# Patient Record
Sex: MALE | Race: WHITE | NOT HISPANIC OR LATINO | URBAN - NONMETROPOLITAN AREA
[De-identification: names, ages, dates, MRNs, and addresses within clinical notes are randomized per-mention and may not be internally consistent; named-entity substitution may affect disease eponyms.]

---

## 2020-07-23 ENCOUNTER — OFFICE VISIT (OUTPATIENT)
Dept: CARDIOLOGY | Facility: CLINIC | Age: 75
End: 2020-07-23

## 2020-07-23 VITALS
SYSTOLIC BLOOD PRESSURE: 130 MMHG | DIASTOLIC BLOOD PRESSURE: 80 MMHG | HEART RATE: 54 BPM | HEIGHT: 69 IN | OXYGEN SATURATION: 97 % | WEIGHT: 167 LBS | BODY MASS INDEX: 24.73 KG/M2

## 2020-07-23 DIAGNOSIS — Z87.898 HISTORY OF SYNCOPE: ICD-10-CM

## 2020-07-23 DIAGNOSIS — I10 ESSENTIAL HYPERTENSION: ICD-10-CM

## 2020-07-23 DIAGNOSIS — E78.5 DYSLIPIDEMIA: ICD-10-CM

## 2020-07-23 DIAGNOSIS — I25.810 CORONARY ARTERY DISEASE INVOLVING CORONARY BYPASS GRAFT OF NATIVE HEART WITHOUT ANGINA PECTORIS: Primary | ICD-10-CM

## 2020-07-23 PROBLEM — N40.0 BPH (BENIGN PROSTATIC HYPERPLASIA): Status: ACTIVE | Noted: 2020-07-23

## 2020-07-23 PROCEDURE — 93000 ELECTROCARDIOGRAM COMPLETE: CPT | Performed by: INTERNAL MEDICINE

## 2020-07-23 PROCEDURE — 99204 OFFICE O/P NEW MOD 45 MIN: CPT | Performed by: INTERNAL MEDICINE

## 2020-07-23 RX ORDER — ATORVASTATIN CALCIUM 20 MG/1
20 TABLET, FILM COATED ORAL NIGHTLY
Qty: 90 TABLET | Refills: 3 | Status: SHIPPED | OUTPATIENT
Start: 2020-07-23 | End: 2020-09-08 | Stop reason: SDUPTHER

## 2020-07-23 RX ORDER — FINASTERIDE 5 MG/1
TABLET, FILM COATED ORAL
COMMUNITY

## 2020-07-23 RX ORDER — TAMSULOSIN HYDROCHLORIDE 0.4 MG/1
CAPSULE ORAL
COMMUNITY
End: 2022-03-03

## 2020-07-23 RX ORDER — ATORVASTATIN CALCIUM 10 MG/1
TABLET, FILM COATED ORAL
COMMUNITY
End: 2020-07-23 | Stop reason: SDUPTHER

## 2020-07-23 NOTE — PROGRESS NOTES
Reason for Visit: History of cardiac disease.    HPI:  Graeme Fernandez is a 74 y.o. male is being seen for consultation today at the request of Mike Toro MD.  He was previously seen by Dr. Doan in 2016 for evaluation of syncope.  He had a tilt table test done in June 2016 that was positive for vasovagal syncope.  He had coronary artery bypass grafting done at [facility] in 2017.  He had a loop recorder placed at [facility] after the surgery but he had it removed by Dr Us since it had not shown any evidence of arrhythmia. He reports the monitor was placed due to his history of syncope.  He has not had any further syncopal episodes since 2016.  He walks a minimum of 2 miles every day.  He denies any chest pain, palpitations, dizziness, syncope, PND, or orthopnea.       Previous Cardiac Testing and Procedures:  -Holter monitor (5/1/2016) rare atrial ventricular ectopic beats, no significant dysrhythmias, no symptoms reported  -Tilt table test (6/15/2016) positive for vasovagal syncope  -Lipid panel (6/23/2020) total cholesterol 165, HDL 52, LDL 87, triglycerides 119    Patient Active Problem List   Diagnosis   • BPH (benign prostatic hyperplasia)   • Dyslipidemia   • Essential hypertension   • Coronary artery disease involving coronary bypass graft of native heart without angina pectoris       Social History     Tobacco Use   • Smoking status: Never Smoker   • Smokeless tobacco: Never Used   Substance Use Topics   • Alcohol use: No   • Drug use: No       Family History   Problem Relation Age of Onset   • Heart disease Mother    • Alzheimer's disease Mother    • Emphysema Father        The following portions of the patient's history were reviewed and updated as appropriate: allergies, current medications, past family history, past medical history, past social history, past surgical history and problem list.      Current Outpatient Medications:   •  aspirin 81 MG EC tablet, Take 81 mg by mouth Daily., Disp: ,  "Rfl:   •  atorvastatin (LIPITOR) 20 MG tablet, Take 1 tablet by mouth Every Night., Disp: 90 tablet, Rfl: 3  •  finasteride (PROSCAR) 5 MG tablet, finasteride 5 mg tablet, Disp: , Rfl:   •  metoprolol tartrate (LOPRESSOR) 25 MG tablet, Take 25 mg by mouth 2 (Two) Times a Day. TAKING 1/2 TABLET BID, Disp: , Rfl:   •  Multiple Vitamins-Minerals (MULTIVITAMIN ADULT PO), Take  by mouth daily., Disp: , Rfl:   •  tamsulosin (FLOMAX) 0.4 MG capsule 24 hr capsule, tamsulosin 0.4 mg capsule, Disp: , Rfl:     Review of Systems   Constitution: Negative for chills, fever and weight loss.   HENT: Negative for sore throat.    Eyes: Negative for blurred vision and visual disturbance.   Cardiovascular: Negative for chest pain, dyspnea on exertion, leg swelling, palpitations, paroxysmal nocturnal dyspnea and syncope.   Respiratory: Negative for cough and shortness of breath.    Endocrine: Negative for cold intolerance and polyuria.   Skin: Negative for itching and rash.   Musculoskeletal: Negative for joint swelling and myalgias.   Gastrointestinal: Negative for abdominal pain, diarrhea, heartburn and vomiting.   Genitourinary: Negative for dysuria and hematuria.   Neurological: Negative for dizziness, headaches and numbness.   Psychiatric/Behavioral: Negative for depression. The patient is not nervous/anxious.    Allergic/Immunologic: Negative for hives.       Objective   /80 (BP Location: Left arm, Patient Position: Sitting, Cuff Size: Adult)   Pulse 54   Ht 175.3 cm (69\")   Wt 75.8 kg (167 lb)   SpO2 97%   BMI 24.66 kg/m²   Physical Exam   Constitutional: He is oriented to person, place, and time. He appears well-developed and well-nourished.   HENT:   Head: Normocephalic and atraumatic.   Eyes: Pupils are equal, round, and reactive to light. Conjunctivae and EOM are normal.   Neck: Normal range of motion. Neck supple. No JVD present. No thyromegaly present.   Cardiovascular: Normal rate, regular rhythm and normal " heart sounds.   No murmur heard.  Pulmonary/Chest: Effort normal and breath sounds normal. He has no wheezes. He has no rales.   Abdominal: Soft. Bowel sounds are normal. He exhibits no distension. There is no tenderness.   Musculoskeletal: Normal range of motion. He exhibits no edema.   Neurological: He is alert and oriented to person, place, and time. Coordination normal.   Skin: Skin is warm and dry. No rash noted.   Psychiatric: He has a normal mood and affect. His behavior is normal.       ECG 12 Lead  Date/Time: 7/23/2020 9:22 AM  Performed by: Shade Tucker MD  Authorized by: Shade Tucker MD   Comparison: compared with previous ECG from 5/30/2018  Similar to previous ECG  Rhythm: sinus bradycardia  Other findings: non-specific ST-T wave changes              ICD-10-CM ICD-9-CM   1. Coronary artery disease involving coronary bypass graft of native heart without angina pectoris I25.810 414.05   2. Dyslipidemia E78.5 272.4   3. Essential hypertension I10 401.9   4. History of syncope Z87.898 V15.89         Assessment/Plan:  1.  Coronary artery disease: History of CABG in 2017 at [place].  Will obtain copy of heart cath and CABG report.  He is asymptomatic.  Continue aspirin, atorvastatin, and metoprolol.    2.  Dyslipidemia: LDL is slightly above goal at 87 (recommended less than 70 given his history of coronary artery disease) will titrate up atorvastatin to 20 mg and  on lifestyle modification.    3.  Essential hypertension: Blood pressure is reasonably well controlled on metoprolol.    4.  History of syncope: Previously diagnosed with vasovagal syncope.  He had a loop recorder placed at [place] which he subsequently had removed due to lack of perceived benefit.  Sinus bradycardia on EKG today.  Continue to monitor.

## 2020-09-07 RX ORDER — ATORVASTATIN CALCIUM 20 MG/1
20 TABLET, FILM COATED ORAL NIGHTLY
Qty: 90 TABLET | Refills: 3 | Status: CANCELLED | OUTPATIENT
Start: 2020-09-07

## 2020-09-08 RX ORDER — ATORVASTATIN CALCIUM 20 MG/1
20 TABLET, FILM COATED ORAL NIGHTLY
Qty: 90 TABLET | Refills: 3 | Status: SHIPPED | OUTPATIENT
Start: 2020-09-08 | End: 2021-08-12 | Stop reason: SDUPTHER

## 2021-08-12 ENCOUNTER — OFFICE VISIT (OUTPATIENT)
Dept: CARDIOLOGY | Facility: CLINIC | Age: 76
End: 2021-08-12

## 2021-08-12 VITALS
SYSTOLIC BLOOD PRESSURE: 112 MMHG | HEIGHT: 69 IN | WEIGHT: 161 LBS | DIASTOLIC BLOOD PRESSURE: 78 MMHG | OXYGEN SATURATION: 98 % | BODY MASS INDEX: 23.85 KG/M2 | HEART RATE: 60 BPM

## 2021-08-12 DIAGNOSIS — I10 ESSENTIAL HYPERTENSION: ICD-10-CM

## 2021-08-12 DIAGNOSIS — E78.5 DYSLIPIDEMIA: ICD-10-CM

## 2021-08-12 DIAGNOSIS — Z87.898 HISTORY OF SYNCOPE: ICD-10-CM

## 2021-08-12 DIAGNOSIS — I25.810 CORONARY ARTERY DISEASE INVOLVING CORONARY BYPASS GRAFT OF NATIVE HEART WITHOUT ANGINA PECTORIS: Primary | ICD-10-CM

## 2021-08-12 PROCEDURE — 99214 OFFICE O/P EST MOD 30 MIN: CPT | Performed by: INTERNAL MEDICINE

## 2021-08-12 RX ORDER — ATORVASTATIN CALCIUM 20 MG/1
20 TABLET, FILM COATED ORAL NIGHTLY
Qty: 90 TABLET | Refills: 3 | Status: SHIPPED | OUTPATIENT
Start: 2021-08-12 | End: 2022-08-09

## 2021-08-12 NOTE — PROGRESS NOTES
Reason for Visit: cardiovascular follow up.    HPI:  Graeme Fernandez is a 75 y.o. male is here today for 1 year follow-up.  Last seen in consultation in July 2020 due to a history of cardiac disease.  Last year he reported being active and feeling well.  He continues to do well and is not having any issues or complaints.  He denies any chest pain, palpitations, dizziness, syncope, PND, or orthopnea.  His blood pressure has been well controlled.  He is active and either walks or does the treadmill regularly.  He tries to eat healthy and has lost a few pounds recently with cutting back on bread.      Previous Cardiac Testing and Procedures:  -Holter monitor (5/1/2016) rare atrial ventricular ectopic beats, no significant dysrhythmias, no symptoms reported  -Tilt table test (6/15/2016) positive for vasovagal syncope  -LHC (4/20/2017) severe single-vessel CAD involving proximal LAD and a large diagonal, normal LCx and RCA  -2 vessel CABG (5/4/2017 at [place]) LIMA to LAD, SVG to diagonal  -Lipid panel (6/23/2020) total cholesterol 165, HDL 52, LDL 87, triglycerides 119    Patient Active Problem List   Diagnosis   • BPH (benign prostatic hyperplasia)   • Dyslipidemia   • Essential hypertension   • Coronary artery disease involving coronary bypass graft of native heart without angina pectoris       Social History     Tobacco Use   • Smoking status: Never Smoker   • Smokeless tobacco: Never Used   Substance Use Topics   • Alcohol use: No   • Drug use: No       Family History   Problem Relation Age of Onset   • Heart disease Mother    • Alzheimer's disease Mother    • Emphysema Father        The following portions of the patient's history were reviewed and updated as appropriate: allergies, current medications, past family history, past medical history, past social history, past surgical history and problem list.      Current Outpatient Medications:   •  aspirin 81 MG EC tablet, Take 81 mg by mouth Daily., Disp: , Rfl:  "  •  atorvastatin (LIPITOR) 20 MG tablet, Take 1 tablet by mouth Every Night., Disp: 90 tablet, Rfl: 3  •  finasteride (PROSCAR) 5 MG tablet, finasteride 5 mg tablet, Disp: , Rfl:   •  metoprolol tartrate (LOPRESSOR) 25 MG tablet, Take 25 mg by mouth 2 (Two) Times a Day. TAKING 1/2 TABLET BID, Disp: , Rfl:   •  Multiple Vitamins-Minerals (MULTIVITAMIN ADULT PO), Take  by mouth daily., Disp: , Rfl:   •  tamsulosin (FLOMAX) 0.4 MG capsule 24 hr capsule, tamsulosin 0.4 mg capsule, Disp: , Rfl:     Review of Systems   Constitutional: Negative for chills and fever.   Cardiovascular: Negative for chest pain, irregular heartbeat, palpitations, paroxysmal nocturnal dyspnea and syncope.   Respiratory: Negative for cough and shortness of breath.    Skin: Negative for rash.   Gastrointestinal: Negative for abdominal pain and heartburn.   Neurological: Negative for dizziness and numbness.       Objective   /78 (BP Location: Left arm, Patient Position: Sitting, Cuff Size: Adult)   Pulse 60   Ht 175.3 cm (69\")   Wt 73 kg (161 lb)   SpO2 98%   BMI 23.78 kg/m²   Constitutional:       Appearance: Well-developed.   HENT:      Head: Normocephalic and atraumatic.   Pulmonary:      Effort: Pulmonary effort is normal.      Breath sounds: Normal breath sounds.   Cardiovascular:      Normal rate. Regular rhythm.      Murmurs: There is no murmur.      No gallop.   Edema:     Peripheral edema absent.   Skin:     General: Skin is warm and dry.   Neurological:      Mental Status: Alert and oriented to person, place, and time.       Procedures      ICD-10-CM ICD-9-CM   1. Coronary artery disease involving coronary bypass graft of native heart without angina pectoris  I25.810 414.05   2. Dyslipidemia  E78.5 272.4   3. Essential hypertension  I10 401.9   4. History of syncope  Z87.898 V15.89         Assessment/Plan:  1. Coronary artery disease: History of two-vessel CABG in 2017 with LIMA to LAD and SVG to diagonal.  He remains chest " pain-free.  Continue aspirin, atorvastatin, and metoprolol.     2.  Dyslipidemia: LDL was slightly above goal at 87 on last available lipid panel from 6/2020.  Obtain copy of most recent lipid panel from PCP.       3.  Essential hypertension: Blood pressure remains well controlled.  Continue metoprolol.      4.  History of syncope: History of vasovagal syncope.  No evidence of recurrence.

## 2022-02-28 ENCOUNTER — TELEPHONE (OUTPATIENT)
Dept: CARDIOLOGY | Facility: CLINIC | Age: 77
End: 2022-02-28

## 2022-02-28 ENCOUNTER — OUTSIDE FACILITY SERVICE (OUTPATIENT)
Dept: CARDIOLOGY | Facility: CLINIC | Age: 77
End: 2022-02-28

## 2022-02-28 NOTE — TELEPHONE ENCOUNTER
It is reasonable to make an appointment for this patient to be seen in clinic for further evaluation.

## 2022-02-28 NOTE — TELEPHONE ENCOUNTER
Pt wife, Jannette, called this morning.  She stated that yesterday while home, pt passed out for several minutes.  Pt wife said that she patted his face, put a cold wash cloth on him and was calling his name.  She ended up calling 911 to which the paramedics arrived shortly thereafter.  By this time, the pt was awake and talking but had no memory of what had happened.  The emergency staff evaluated the pt and then left.  The pt has since felt very weak.  Jannette stated that she has pushed fluids on the pt for hydration.  She is concerned about this being an incident that is heart related.  She wasn't sure if he may need some type of testing or EKG.  She also doesn't mind driving to [place] if needed.    Please advise.

## 2022-02-28 NOTE — TELEPHONE ENCOUNTER
Called pt wife back to let her know that Dr. Tucker said that a check up in office would be reasonable.  They are currently in the ER at [facility] and will call us afterward.

## 2022-02-28 NOTE — TELEPHONE ENCOUNTER
I returned pt wife call. I advised her to take her  to ER instead. I explained to her that the hospital can do more test than what we could offer for the pt in the office settings. She voiced understanding and the pt agreed.

## 2022-03-02 PROCEDURE — 93227 XTRNL ECG REC<48 HR R&I: CPT | Performed by: INTERNAL MEDICINE

## 2022-03-03 ENCOUNTER — OFFICE VISIT (OUTPATIENT)
Dept: CARDIOLOGY | Facility: CLINIC | Age: 77
End: 2022-03-03

## 2022-03-03 VITALS
WEIGHT: 160 LBS | HEIGHT: 69 IN | BODY MASS INDEX: 23.7 KG/M2 | SYSTOLIC BLOOD PRESSURE: 124 MMHG | HEART RATE: 58 BPM | OXYGEN SATURATION: 99 % | DIASTOLIC BLOOD PRESSURE: 76 MMHG

## 2022-03-03 DIAGNOSIS — R55 SYNCOPE AND COLLAPSE: ICD-10-CM

## 2022-03-03 DIAGNOSIS — I10 ESSENTIAL HYPERTENSION: ICD-10-CM

## 2022-03-03 DIAGNOSIS — E78.5 DYSLIPIDEMIA: ICD-10-CM

## 2022-03-03 DIAGNOSIS — I25.810 CORONARY ARTERY DISEASE INVOLVING CORONARY BYPASS GRAFT OF NATIVE HEART WITHOUT ANGINA PECTORIS: Primary | ICD-10-CM

## 2022-03-03 PROCEDURE — 99214 OFFICE O/P EST MOD 30 MIN: CPT | Performed by: INTERNAL MEDICINE

## 2022-03-03 RX ORDER — TAMSULOSIN HYDROCHLORIDE 0.4 MG/1
1 CAPSULE ORAL DAILY
COMMUNITY
End: 2022-09-07

## 2022-03-03 RX ORDER — PROPYLENE GLYCOL 0.06 MG/ML
SOLUTION/ DROPS OPHTHALMIC
COMMUNITY

## 2022-03-03 NOTE — PROGRESS NOTES
Reason for Visit: cardiovascular follow up.    HPI:  Graeme Fernandez is a 76 y.o. male is here today for follow-up after a syncopal episode. This occurred on 2/28/2021. He went to [facility]. Work-up in the emergency room was unremarkable. He had a Holter monitor placed on discharge, which did not show any significant arrhythmias.  He was on a Sunday school zoom meeting when it happened.  He felt weak and dizzy afterwards.  The night before he was at a basketball game and didn't drink much that night to avoid having to go the bathroom.  He has been trying to drink more water following the episode.  He has been walking regularly.      Previous Cardiac Testing and Procedures:  -Holter monitor (5/1/2016) rare atrial ventricular ectopic beats, no significant dysrhythmias, no symptoms reported  -Tilt table test (6/15/2016) positive for vasovagal syncope  -LHC (4/20/2017) severe single-vessel CAD involving proximal LAD and a large diagonal, normal LCx and RCA  -2 vessel CABG (5/4/2017 at [place]) LIMA to LAD, SVG to diagonal  -Lipid panel (6/23/2020) total cholesterol 165, HDL 52, LDL 87, triglycerides 119  -Lipid panel (5/23/2021) total cholesterol 139, HDL 42, LDL 57, triglycerides 196  -BMP (3/28/2022) creatinine 1.03, potassium 3.9, sodium 139  -NT proBNP (2/28/2022) forty-four, normal 0-125  -Chest x-ray (2/28/2022) mild left lower lobe opacities, evidence of CABG  -Holter monitor (2/28/2022) rare atrial and ventricular ectopic beats, no significant pauses, arrhythmias, or events, no symptoms reported    Patient Active Problem List   Diagnosis   • BPH (benign prostatic hyperplasia)   • Dyslipidemia   • Essential hypertension   • Coronary artery disease involving coronary bypass graft of native heart without angina pectoris       Social History     Tobacco Use   • Smoking status: Never Smoker   • Smokeless tobacco: Never Used   Vaping Use   • Vaping Use: Never used   Substance Use Topics   •  "Alcohol use: No   • Drug use: No       Family History   Problem Relation Age of Onset   • Heart disease Mother    • Alzheimer's disease Mother    • Emphysema Father        The following portions of the patient's history were reviewed and updated as appropriate: allergies, current medications, past family history, past medical history, past social history, past surgical history and problem list.      Current Outpatient Medications:   •  aspirin 81 MG EC tablet, Take 81 mg by mouth Daily., Disp: , Rfl:   •  atorvastatin (LIPITOR) 20 MG tablet, Take 1 tablet by mouth Every Night., Disp: 90 tablet, Rfl: 3  •  finasteride (PROSCAR) 5 MG tablet, finasteride 5 mg tablet, Disp: , Rfl:   •  metoprolol tartrate (LOPRESSOR) 25 MG tablet, Take 25 mg by mouth 2 (Two) Times a Day. TAKING 1/2 TABLET BID, Disp: , Rfl:   •  Multiple Vitamins-Minerals (MULTIVITAMIN ADULT PO), Take  by mouth daily., Disp: , Rfl:   •  tamsulosin (FLOMAX) 0.4 MG capsule 24 hr capsule, Take 1 capsule by mouth Daily., Disp: , Rfl:   •  Propylene Glycol (Systane Complete) 0.6 % solution, Systane Complete 0.6 % eye drops  Systane Complete 0.6 % eye drops, Disp: , Rfl:     Review of Systems   Constitutional: Negative for chills and fever.   Cardiovascular: Positive for syncope. Negative for chest pain and paroxysmal nocturnal dyspnea.   Respiratory: Negative for cough and shortness of breath.    Skin: Negative for rash.   Gastrointestinal: Negative for abdominal pain and heartburn.   Genitourinary: Positive for frequency.   Neurological: Negative for dizziness and numbness.       Objective   /76 (BP Location: Left arm, Patient Position: Sitting, Cuff Size: Adult)   Pulse 58   Ht 175.3 cm (69.02\")   Wt 72.6 kg (160 lb)   SpO2 99%   BMI 23.62 kg/m²   Constitutional:       Appearance: Well-developed and normal weight.   HENT:      Head: Normocephalic and atraumatic.   Neck:      Vascular: Normal carotid pulses. No carotid bruit.   Pulmonary:      " Effort: Pulmonary effort is normal.      Breath sounds: Normal breath sounds.   Cardiovascular:      Normal rate. Regular rhythm.      Murmurs: There is no murmur.      No gallop. No click.   Edema:     Peripheral edema absent.   Skin:     General: Skin is warm and dry.   Neurological:      Mental Status: Alert and oriented to person, place, and time.   Psychiatric:         Attention and Perception: Attention normal.         Mood and Affect: Mood normal.       Procedures      ICD-10-CM ICD-9-CM   1. Coronary artery disease involving coronary bypass graft of native heart without angina pectoris  I25.810 414.05   2. Essential hypertension  I10 401.9   3. Dyslipidemia  E78.5 272.4   4. Syncope and collapse  R55 780.2         Assessment/Plan:  1.  Coronary artery disease: History of two-vessel CABG in 2017 with LIMA to LAD and SVG to diagonal.  No chest pain symptoms.  Continue aspirin, atorvastatin, and metoprolol.     2.  Dyslipidemia: LDL was slightly above goal at 87 on last available lipid panel from 6/2020.  Obtain copy of most recent lipid panel from PCP.       3.  Essential hypertension: Blood pressure is within normal limits today on metoprolol.     4.  Syncope: Most recent episode appears to be due to volume depletion.  Flomax could have also contributed.  Physical exam is normal no evidence of any cardiac etiology.  Holter monitor from 2/28/2022 showed no evidence of any significant arrhythmias.  Counseled on staying well-hydrated.  May need to consider alternatives Flomax if he has further episodes.  If he continues to have episodes echocardiogram and carotid ultrasound could be considered.

## 2022-05-10 ENCOUNTER — OUTSIDE FACILITY SERVICE (OUTPATIENT)
Dept: CARDIOLOGY | Facility: CLINIC | Age: 77
End: 2022-05-10

## 2022-05-10 PROCEDURE — 93018 CV STRESS TEST I&R ONLY: CPT | Performed by: INTERNAL MEDICINE

## 2022-05-10 PROCEDURE — 93350 STRESS TTE ONLY: CPT | Performed by: INTERNAL MEDICINE

## 2022-08-09 RX ORDER — ATORVASTATIN CALCIUM 20 MG/1
TABLET, FILM COATED ORAL
Qty: 90 TABLET | Refills: 3 | Status: SHIPPED | OUTPATIENT
Start: 2022-08-09

## 2022-09-07 ENCOUNTER — OFFICE VISIT (OUTPATIENT)
Dept: CARDIOLOGY | Facility: CLINIC | Age: 77
End: 2022-09-07

## 2022-09-07 VITALS
WEIGHT: 158 LBS | OXYGEN SATURATION: 98 % | BODY MASS INDEX: 23.4 KG/M2 | SYSTOLIC BLOOD PRESSURE: 120 MMHG | HEIGHT: 69 IN | DIASTOLIC BLOOD PRESSURE: 76 MMHG | HEART RATE: 73 BPM

## 2022-09-07 DIAGNOSIS — E78.5 DYSLIPIDEMIA: ICD-10-CM

## 2022-09-07 DIAGNOSIS — I10 ESSENTIAL HYPERTENSION: ICD-10-CM

## 2022-09-07 DIAGNOSIS — Z87.898 HISTORY OF SYNCOPE: ICD-10-CM

## 2022-09-07 DIAGNOSIS — I25.810 CORONARY ARTERY DISEASE INVOLVING CORONARY BYPASS GRAFT OF NATIVE HEART WITHOUT ANGINA PECTORIS: Primary | ICD-10-CM

## 2022-09-07 PROCEDURE — 99214 OFFICE O/P EST MOD 30 MIN: CPT | Performed by: INTERNAL MEDICINE

## 2022-09-07 RX ORDER — TAMSULOSIN HYDROCHLORIDE 0.4 MG/1
1 CAPSULE ORAL DAILY
COMMUNITY

## 2022-09-07 NOTE — PROGRESS NOTES
Reason for Visit: cardiovascular follow up.    HPI:  Graeme Fernandez is a 76 y.o. male is here today for 6-month follow-up.  He was last seen in March after a syncopal episode.  He has not had any further episodes since last visit.  He has been working to drink more water and he really thinks that dehydration played a role in his previous syncopal episodes.  He denies any chest pain, palpitations, dizziness, PND, or orthopnea.    Previous Cardiac Testing and Procedures:  -Holter monitor (5/1/2016) rare atrial ventricular ectopic beats, no significant dysrhythmias, no symptoms reported  -Tilt table test (6/15/2016) positive for vasovagal syncope  -LHC (4/20/2017) severe single-vessel CAD involving proximal LAD and a large diagonal, normal LCx and RCA  -2 vessel CABG (5/4/2017 at [place]) LIMA to LAD, SVG to diagonal  -Lipid panel (6/23/2020) total cholesterol 165, HDL 52, LDL 87, triglycerides 119  -Lipid panel (5/23/2021) total cholesterol 139, HDL 42, LDL 57, triglycerides 196  -BMP (3/28/2022) creatinine 1.03, potassium 3.9, sodium 139  -NT proBNP (2/28/2022) forty-four, normal 0-125  -Chest x-ray (2/28/2022) mild left lower lobe opacities, evidence of CABG  -Holter monitor (2/28/2022) rare atrial and ventricular ectopic beats, no significant pauses, arrhythmias, or events, no symptoms reported  -Stress echo (5/10/2022) negative for ischemia    Patient Active Problem List   Diagnosis   • BPH (benign prostatic hyperplasia)   • Dyslipidemia   • Essential hypertension   • Coronary artery disease involving coronary bypass graft of native heart without angina pectoris       Social History     Tobacco Use   • Smoking status: Never Smoker   • Smokeless tobacco: Never Used   Vaping Use   • Vaping Use: Never used   Substance Use Topics   • Alcohol use: No   • Drug use: No       Family History   Problem Relation Age of Onset   • Heart disease Mother    • Alzheimer's disease Mother    • Emphysema Father        The  "following portions of the patient's history were reviewed and updated as appropriate: allergies, current medications, past family history, past medical history, past social history, past surgical history and problem list.      Current Outpatient Medications:   •  aspirin 81 MG EC tablet, Take 81 mg by mouth Daily., Disp: , Rfl:   •  atorvastatin (LIPITOR) 20 MG tablet, TAKE ONE TABLET BY MOUTH EVERY NIGHT, Disp: 90 tablet, Rfl: 3  •  finasteride (PROSCAR) 5 MG tablet, finasteride 5 mg tablet, Disp: , Rfl:   •  metoprolol tartrate (LOPRESSOR) 25 MG tablet, Take 25 mg by mouth 2 (Two) Times a Day. TAKING 1/2 TABLET BID, Disp: , Rfl:   •  Multiple Vitamins-Minerals (MULTIVITAMIN ADULT PO), Take  by mouth daily., Disp: , Rfl:   •  Propylene Glycol (Systane Complete) 0.6 % solution, Systane Complete 0.6 % eye drops  Systane Complete 0.6 % eye drops, Disp: , Rfl:   •  tamsulosin (FLOMAX) 0.4 MG capsule 24 hr capsule, Take 1 capsule by mouth Daily., Disp: , Rfl:     Review of Systems   Constitutional: Negative for chills and fever.   Cardiovascular: Negative for chest pain, dyspnea on exertion, palpitations, paroxysmal nocturnal dyspnea and syncope.   Respiratory: Negative for cough and shortness of breath.    Skin: Negative for rash.   Gastrointestinal: Negative for abdominal pain and heartburn.   Neurological: Negative for dizziness and numbness.       Objective   /76 (BP Location: Left arm, Patient Position: Sitting, Cuff Size: Adult)   Pulse 73   Ht 175.3 cm (69.02\")   Wt 71.7 kg (158 lb)   SpO2 98%   BMI 23.32 kg/m²   Constitutional:       Appearance: Well-developed and normal weight.   HENT:      Head: Normocephalic and atraumatic.   Pulmonary:      Effort: Pulmonary effort is normal.      Breath sounds: Normal breath sounds.   Cardiovascular:      Normal rate. Regular rhythm.      Murmurs: There is no murmur.      No gallop.   Edema:     Peripheral edema absent.   Skin:     General: Skin is warm and dry. "   Neurological:      Mental Status: Alert and oriented to person, place, and time.       Procedures      ICD-10-CM ICD-9-CM   1. Coronary artery disease involving coronary bypass graft of native heart without angina pectoris  I25.810 414.05   2. Dyslipidemia  E78.5 272.4   3. Essential hypertension  I10 401.9   4. History of syncope  Z87.898 V15.89         Assessment/Plan:  1.  Coronary artery disease: 2-vessel CABG in 2017 with LIMA to LAD and SVG to diagonal.  Stress echo from 5/10/2022 at his primary care office was low risk.  He remains chest pain-free.  Continue aspirin, atorvastatin, and metoprolol.     2.  Dyslipidemia: Lipid panel from 5/3/2021 showed good control.  Continue atorvastatin.      3.  Essential hypertension:  Blood pressure remains well controlled.  Continue metoprolol.       4.  History of syncope: Episode February 2022.  Likely due to dehydration.  No recurrent episodes and staying well-hydrated.  Encourage continued hydration.

## 2023-08-03 RX ORDER — ATORVASTATIN CALCIUM 20 MG/1
TABLET, FILM COATED ORAL
Qty: 90 TABLET | Refills: 3 | Status: SHIPPED | OUTPATIENT
Start: 2023-08-03

## 2023-09-06 ENCOUNTER — OFFICE VISIT (OUTPATIENT)
Dept: CARDIOLOGY | Facility: CLINIC | Age: 78
End: 2023-09-06
Payer: MEDICARE

## 2023-09-06 VITALS
BODY MASS INDEX: 23.85 KG/M2 | OXYGEN SATURATION: 98 % | HEART RATE: 61 BPM | SYSTOLIC BLOOD PRESSURE: 128 MMHG | DIASTOLIC BLOOD PRESSURE: 72 MMHG | HEIGHT: 69 IN | WEIGHT: 161 LBS

## 2023-09-06 DIAGNOSIS — E78.5 DYSLIPIDEMIA: ICD-10-CM

## 2023-09-06 DIAGNOSIS — I25.810 CORONARY ARTERY DISEASE INVOLVING CORONARY BYPASS GRAFT OF NATIVE HEART WITHOUT ANGINA PECTORIS: Primary | ICD-10-CM

## 2023-09-06 DIAGNOSIS — I10 ESSENTIAL HYPERTENSION: ICD-10-CM

## 2023-09-06 PROCEDURE — 1160F RVW MEDS BY RX/DR IN RCRD: CPT | Performed by: INTERNAL MEDICINE

## 2023-09-06 PROCEDURE — 93000 ELECTROCARDIOGRAM COMPLETE: CPT | Performed by: INTERNAL MEDICINE

## 2023-09-06 PROCEDURE — 3078F DIAST BP <80 MM HG: CPT | Performed by: INTERNAL MEDICINE

## 2023-09-06 PROCEDURE — 1159F MED LIST DOCD IN RCRD: CPT | Performed by: INTERNAL MEDICINE

## 2023-09-06 PROCEDURE — 3074F SYST BP LT 130 MM HG: CPT | Performed by: INTERNAL MEDICINE

## 2023-09-06 PROCEDURE — 99214 OFFICE O/P EST MOD 30 MIN: CPT | Performed by: INTERNAL MEDICINE

## 2023-09-06 NOTE — PROGRESS NOTES
Reason for Visit: cardiovascular follow up.    HPI:  Graeme Fernandez is a 77 y.o. male is here today for follow-up.  He is doing well and not having any issues or complaints.  He drinks lots of water and tries to stay hydrated.  He denies any chest pain, palpitations, dizziness, syncope, PND, or orthopnea.  He tries to walk regularly for exercise, about 6 days a week on average.      Previous Cardiac Testing and Procedures:  -Holter monitor (5/1/2016) rare atrial ventricular ectopic beats, no significant dysrhythmias, no symptoms reported  -Tilt table test (6/15/2016) positive for vasovagal syncope  -LHC (4/20/2017) severe single-vessel CAD involving proximal LAD and a large diagonal, normal LCx and RCA  -2 vessel CABG (5/4/2017 at [place]) LIMA to LAD, SVG to diagonal  -Chest x-ray (2/28/2022) mild left lower lobe opacities, evidence of CABG  -Holter monitor (2/28/2022) rare atrial and ventricular ectopic beats, no significant pauses, arrhythmias, or events, no symptoms reported  -Stress echo (5/10/2022) negative for ischemia    Lab data:  -Lipid panel (6/23/2020) total cholesterol 165, HDL 52, LDL 87, triglycerides 119  -Lipid panel (5/23/2021) total cholesterol 139, HDL 42, LDL 57, triglycerides 196  -BMP (3/28/2022) creatinine 1.03, potassium 3.9, sodium 139  -NT proBNP (2/28/2022) 44, normal 0-125  -Lipid panel (5/3/2022) total cholesterol 147, HDL 55, LDL 56, triglycerides 178      Patient Active Problem List   Diagnosis    BPH (benign prostatic hyperplasia)    Dyslipidemia    Essential hypertension    Coronary artery disease involving coronary bypass graft of native heart without angina pectoris       Social History     Tobacco Use    Smoking status: Never    Smokeless tobacco: Never   Vaping Use    Vaping Use: Never used   Substance Use Topics    Alcohol use: No    Drug use: No       Family History   Problem Relation Age of Onset    Heart disease Mother     Alzheimer's disease Mother     Emphysema  "Father        The following portions of the patient's history were reviewed and updated as appropriate: allergies, current medications, past family history, past medical history, past social history, past surgical history, and problem list.      Current Outpatient Medications:     aspirin 81 MG EC tablet, Take 1 tablet by mouth Daily., Disp: , Rfl:     atorvastatin (LIPITOR) 20 MG tablet, TAKE ONE TABLET BY MOUTH EVERY NIGHT, Disp: 90 tablet, Rfl: 3    finasteride (PROSCAR) 5 MG tablet, finasteride 5 mg tablet, Disp: , Rfl:     metoprolol tartrate (LOPRESSOR) 25 MG tablet, Take 1 tablet by mouth 2 (Two) Times a Day. TAKING 1/2 TABLET BID, Disp: , Rfl:     Multiple Vitamins-Minerals (MULTIVITAMIN ADULT PO), Take  by mouth daily., Disp: , Rfl:     Propylene Glycol (Systane Complete) 0.6 % solution, Systane Complete 0.6 % eye drops  Systane Complete 0.6 % eye drops, Disp: , Rfl:     tamsulosin (FLOMAX) 0.4 MG capsule 24 hr capsule, Take 1 capsule by mouth Daily., Disp: , Rfl:     Review of Systems   Constitutional: Negative for chills and fever.   Cardiovascular:  Negative for chest pain, dyspnea on exertion, irregular heartbeat and paroxysmal nocturnal dyspnea.   Respiratory:  Negative for cough and shortness of breath.    Skin:  Negative for rash.   Gastrointestinal:  Negative for abdominal pain and heartburn.   Neurological:  Negative for dizziness and numbness.     Objective   /72 (BP Location: Left arm, Patient Position: Sitting, Cuff Size: Adult)   Pulse 61   Ht 175.3 cm (69.02\")   Wt 73 kg (161 lb)   SpO2 98%   BMI 23.76 kg/m²   Constitutional:       Appearance: Well-developed.   HENT:      Head: Normocephalic and atraumatic.   Pulmonary:      Effort: Pulmonary effort is normal.      Breath sounds: Normal breath sounds.   Cardiovascular:      Normal rate. Regular rhythm.      Murmurs: There is no murmur.      No gallop.  No click.   Edema:     Peripheral edema absent.   Skin:     General: Skin is warm " and dry.   Neurological:      Mental Status: Alert and oriented to person, place, and time.       ECG 12 Lead    Date/Time: 9/6/2023 1:50 PM  Performed by: Shade Tucker MD  Authorized by: Shade Tucker MD   Comparison: compared with previous ECG from 2/28/2022  Similar to previous ECG  Rhythm: sinus rhythm  Rate: normal  Other findings: non-specific ST-T wave changes          ICD-10-CM ICD-9-CM   1. Coronary artery disease involving coronary bypass graft of native heart without angina pectoris  I25.810 414.05   2. Dyslipidemia  E78.5 272.4   3. Essential hypertension  I10 401.9         Assessment/Plan:  1.  Coronary artery disease: 2-vessel CABG in 2017 with LIMA to LAD and SVG to diagonal.  Low risk stress echo on 5/10/2022.  No chest pain symptoms.  Continue aspirin, metoprolol, and atorvastatin.     2.  Dyslipidemia: Good control other than mildly elevated triglycerides on lipid panel from 5/3/2022.  Continue atorvastatin.      3.  Essential hypertension: Blood pressures well controlled on metoprolol.

## 2023-09-06 NOTE — LETTER
September 6, 2023     Mike Toro MD  [address] [facility] [address]    Patient: Graeme Fernandez   YOB: 1945   Date of Visit: 9/6/2023       Dear Mike Toro MD    Graeme Fernandez was in my office today. Below is a copy of my note.    If you have questions, please do not hesitate to call me. I look forward to following Graeme along with you.         Sincerely,        Shade Tucker MD        CC: No Recipients      Reason for Visit: cardiovascular follow up.    HPI:  Graeme Fernandez is a 77 y.o. male is here today for follow-up.  He is doing well and not having any issues or complaints.  He drinks lots of water and tries to stay hydrated.  He denies any chest pain, palpitations, dizziness, syncope, PND, or orthopnea.  He tries to walk regularly for exercise, about 6 days a week on average.      Previous Cardiac Testing and Procedures:  -Holter monitor (5/1/2016) rare atrial ventricular ectopic beats, no significant dysrhythmias, no symptoms reported  -Tilt table test (6/15/2016) positive for vasovagal syncope  -LHC (4/20/2017) severe single-vessel CAD involving proximal LAD and a large diagonal, normal LCx and RCA  -2 vessel CABG (5/4/2017 at [place]) LIMA to LAD, SVG to diagonal  -Chest x-ray (2/28/2022) mild left lower lobe opacities, evidence of CABG  -Holter monitor (2/28/2022) rare atrial and ventricular ectopic beats, no significant pauses, arrhythmias, or events, no symptoms reported  -Stress echo (5/10/2022) negative for ischemia    Lab data:  -Lipid panel (6/23/2020) total cholesterol 165, HDL 52, LDL 87, triglycerides 119  -Lipid panel (5/23/2021) total cholesterol 139, HDL 42, LDL 57, triglycerides 196  -BMP (3/28/2022) creatinine 1.03, potassium 3.9, sodium 139  -NT proBNP (2/28/2022) 44, normal 0-125  -Lipid panel (5/3/2022) total cholesterol 147, HDL 55, LDL 56, triglycerides 178      Patient Active Problem List   Diagnosis   • BPH (benign prostatic hyperplasia)   •  Dyslipidemia   • Essential hypertension   • Coronary artery disease involving coronary bypass graft of native heart without angina pectoris       Social History     Tobacco Use   • Smoking status: Never   • Smokeless tobacco: Never   Vaping Use   • Vaping Use: Never used   Substance Use Topics   • Alcohol use: No   • Drug use: No       Family History   Problem Relation Age of Onset   • Heart disease Mother    • Alzheimer's disease Mother    • Emphysema Father        The following portions of the patient's history were reviewed and updated as appropriate: allergies, current medications, past family history, past medical history, past social history, past surgical history, and problem list.      Current Outpatient Medications:   •  aspirin 81 MG EC tablet, Take 1 tablet by mouth Daily., Disp: , Rfl:   •  atorvastatin (LIPITOR) 20 MG tablet, TAKE ONE TABLET BY MOUTH EVERY NIGHT, Disp: 90 tablet, Rfl: 3  •  finasteride (PROSCAR) 5 MG tablet, finasteride 5 mg tablet, Disp: , Rfl:   •  metoprolol tartrate (LOPRESSOR) 25 MG tablet, Take 1 tablet by mouth 2 (Two) Times a Day. TAKING 1/2 TABLET BID, Disp: , Rfl:   •  Multiple Vitamins-Minerals (MULTIVITAMIN ADULT PO), Take  by mouth daily., Disp: , Rfl:   •  Propylene Glycol (Systane Complete) 0.6 % solution, Systane Complete 0.6 % eye drops  Systane Complete 0.6 % eye drops, Disp: , Rfl:   •  tamsulosin (FLOMAX) 0.4 MG capsule 24 hr capsule, Take 1 capsule by mouth Daily., Disp: , Rfl:     Review of Systems   Constitutional: Negative for chills and fever.   Cardiovascular:  Negative for chest pain, dyspnea on exertion, irregular heartbeat and paroxysmal nocturnal dyspnea.   Respiratory:  Negative for cough and shortness of breath.    Skin:  Negative for rash.   Gastrointestinal:  Negative for abdominal pain and heartburn.   Neurological:  Negative for dizziness and numbness.     Objective  /72 (BP Location: Left arm, Patient Position: Sitting, Cuff Size: Adult)    "Pulse 61   Ht 175.3 cm (69.02\")   Wt 73 kg (161 lb)   SpO2 98%   BMI 23.76 kg/m²   Constitutional:       Appearance: Well-developed.   HENT:      Head: Normocephalic and atraumatic.   Pulmonary:      Effort: Pulmonary effort is normal.      Breath sounds: Normal breath sounds.   Cardiovascular:      Normal rate. Regular rhythm.      Murmurs: There is no murmur.      No gallop.  No click.   Edema:     Peripheral edema absent.   Skin:     General: Skin is warm and dry.   Neurological:      Mental Status: Alert and oriented to person, place, and time.       ECG 12 Lead    Date/Time: 9/6/2023 1:50 PM  Performed by: Shade Tucker MD  Authorized by: Shade Tucker MD   Comparison: compared with previous ECG from 2/28/2022  Similar to previous ECG  Rhythm: sinus rhythm  Rate: normal  Other findings: non-specific ST-T wave changes          ICD-10-CM ICD-9-CM   1. Coronary artery disease involving coronary bypass graft of native heart without angina pectoris  I25.810 414.05   2. Dyslipidemia  E78.5 272.4   3. Essential hypertension  I10 401.9         Assessment/Plan:  1.  Coronary artery disease: 2-vessel CABG in 2017 with LIMA to LAD and SVG to diagonal.  Low risk stress echo on 5/10/2022.  No chest pain symptoms.  Continue aspirin, metoprolol, and atorvastatin.     2.  Dyslipidemia: Good control other than mildly elevated triglycerides on lipid panel from 5/3/2022.  Continue atorvastatin.      3.  Essential hypertension: Blood pressures well controlled on metoprolol.     "

## 2024-07-29 RX ORDER — ATORVASTATIN CALCIUM 20 MG/1
TABLET, FILM COATED ORAL
Qty: 90 TABLET | Refills: 3 | Status: SHIPPED | OUTPATIENT
Start: 2024-07-29

## 2024-10-02 NOTE — PROGRESS NOTES
Reason for Visit: cardiovascular follow up.    HPI:  Graeme Fernandez is a 78 y.o. male is here today for 1 year follow-up.  He follows in cardiology clinic secondary to coronary artery disease with a history of two-vessel CABG in 2017.  He is doing well and not having any issues or complaints.  He denies any chest pain, palpitations, dizziness, syncope, PND, or orthopnea.  He walks daily for about 45 minutes, sometimes outside and sometimes at [employer].      Previous Cardiac Testing and Procedures:  -Holter monitor (5/1/2016) rare atrial ventricular ectopic beats, no significant dysrhythmias, no symptoms reported  -Tilt table test (6/15/2016) positive for vasovagal syncope  -LHC (4/20/2017) severe single-vessel CAD involving proximal LAD and a large diagonal, normal LCx and RCA  -2 vessel CABG (5/4/2017 at [place]) LIMA to LAD, SVG to diagonal  -Chest x-ray (2/28/2022) mild left lower lobe opacities, evidence of CABG  -Holter monitor (2/28/2022) rare atrial and ventricular ectopic beats, no significant pauses, arrhythmias, or events, no symptoms reported  -Stress echo (5/10/2022) negative for ischemia     Lab data:  -Lipid panel (6/23/2020) total cholesterol 165, HDL 52, LDL 87, triglycerides 119  -Lipid panel (5/23/2021) total cholesterol 139, HDL 42, LDL 57, triglycerides 196  -BMP (3/28/2022) creatinine 1.03, potassium 3.9, sodium 139  -NT proBNP (2/28/2022) 44, normal 0-125  -Lipid panel (5/3/2022) total cholesterol 147, HDL 55, LDL 56, triglycerides 178    Patient Active Problem List   Diagnosis    BPH (benign prostatic hyperplasia)    Dyslipidemia    Essential hypertension    Coronary artery disease involving coronary bypass graft of native heart without angina pectoris       Social History     Tobacco Use    Smoking status: Never    Smokeless tobacco: Never   Vaping Use    Vaping status: Never Used   Substance Use Topics    Alcohol use: Never    Drug use: Never       Family History   Problem  "Relation Age of Onset    Heart disease Mother     Alzheimer's disease Mother     Emphysema Father        The following portions of the patient's history were reviewed and updated as appropriate: allergies, current medications, past family history, past medical history, past social history, past surgical history, and problem list.      Current Outpatient Medications:     aspirin 81 MG EC tablet, Take 1 tablet by mouth Daily., Disp: , Rfl:     atorvastatin (LIPITOR) 20 MG tablet, TAKE ONE TABLET BY MOUTH EVERY NIGHT, Disp: 90 tablet, Rfl: 3    finasteride (PROSCAR) 5 MG tablet, finasteride 5 mg tablet, Disp: , Rfl:     metoprolol tartrate (LOPRESSOR) 25 MG tablet, Take 1 tablet by mouth 2 (Two) Times a Day. TAKING 1/2 TABLET BID, Disp: , Rfl:     Multiple Vitamins-Minerals (MULTIVITAMIN ADULT PO), Take  by mouth daily., Disp: , Rfl:     Propylene Glycol (Systane Complete) 0.6 % solution, Systane Complete 0.6 % eye drops  Systane Complete 0.6 % eye drops, Disp: , Rfl:     tamsulosin (FLOMAX) 0.4 MG capsule 24 hr capsule, Take 1 capsule by mouth Daily., Disp: , Rfl:     Review of Systems   Constitutional: Negative for chills and fever.   Cardiovascular:  Negative for chest pain and paroxysmal nocturnal dyspnea.   Respiratory:  Negative for cough and shortness of breath.    Skin:  Negative for rash.   Gastrointestinal:  Negative for abdominal pain and heartburn.   Neurological:  Negative for dizziness and numbness.       Objective   /82 (BP Location: Left arm, Patient Position: Sitting, Cuff Size: Adult)   Pulse 52   Ht 175.3 cm (69.02\")   Wt 73.9 kg (163 lb)   SpO2 99%   BMI 24.06 kg/m²   Constitutional:       Appearance: Well-developed.   HENT:      Head: Normocephalic and atraumatic.   Pulmonary:      Effort: Pulmonary effort is normal.      Breath sounds: Normal breath sounds.   Cardiovascular:      Normal rate. Regular rhythm.      Murmurs: There is no murmur.   Edema:     Peripheral edema absent. "   Skin:     General: Skin is warm and dry.   Neurological:      Mental Status: Alert and oriented to person, place, and time.       Procedures      ICD-10-CM ICD-9-CM   1. Coronary artery disease involving coronary bypass graft of native heart without angina pectoris  I25.810 414.05   2. Dyslipidemia  E78.5 272.4   3. Essential hypertension  I10 401.9         Assessment/Plan:  1.  Coronary artery disease: 2-vessel CABG in 2017 with LIMA to LAD and SVG to diagonal.  Low risk stress echo on 5/10/2022.  He remains chest pain-free.  Continue aspirin, metoprolol, and atorvastatin.     2.  Dyslipidemia: Continue atorvastatin and obtain copy of most recent lipid panel from PCP.      3.  Essential hypertension: Well-controlled today.  Continue metoprolol.

## 2024-10-03 ENCOUNTER — OFFICE VISIT (OUTPATIENT)
Dept: CARDIOLOGY | Facility: CLINIC | Age: 79
End: 2024-10-03
Payer: MEDICARE

## 2024-10-03 VITALS
HEART RATE: 52 BPM | DIASTOLIC BLOOD PRESSURE: 82 MMHG | HEIGHT: 69 IN | OXYGEN SATURATION: 99 % | WEIGHT: 163 LBS | SYSTOLIC BLOOD PRESSURE: 126 MMHG | BODY MASS INDEX: 24.14 KG/M2

## 2024-10-03 DIAGNOSIS — I25.810 CORONARY ARTERY DISEASE INVOLVING CORONARY BYPASS GRAFT OF NATIVE HEART WITHOUT ANGINA PECTORIS: Primary | ICD-10-CM

## 2024-10-03 DIAGNOSIS — I10 ESSENTIAL HYPERTENSION: ICD-10-CM

## 2024-10-03 DIAGNOSIS — E78.5 DYSLIPIDEMIA: ICD-10-CM

## 2024-10-03 PROCEDURE — 99214 OFFICE O/P EST MOD 30 MIN: CPT | Performed by: INTERNAL MEDICINE

## 2024-10-03 PROCEDURE — 1160F RVW MEDS BY RX/DR IN RCRD: CPT | Performed by: INTERNAL MEDICINE

## 2024-10-03 PROCEDURE — 1159F MED LIST DOCD IN RCRD: CPT | Performed by: INTERNAL MEDICINE

## 2024-10-03 PROCEDURE — 3079F DIAST BP 80-89 MM HG: CPT | Performed by: INTERNAL MEDICINE

## 2024-10-03 PROCEDURE — 3074F SYST BP LT 130 MM HG: CPT | Performed by: INTERNAL MEDICINE

## 2024-10-03 NOTE — LETTER
October 3, 2024     SOPHY Ashton  [address] [facility] [address]    Patient: Graeme Fernandez   YOB: 1945   Date of Visit: 10/3/2024       Dear SOPHY Ashton    Graeme Fernandez was in my office today. Below is a copy of my note.    If you have questions, please do not hesitate to call me. I look forward to following Graeme along with you.         Sincerely,        Shade Tucker MD        CC: No Recipients      Reason for Visit: cardiovascular follow up.    HPI:  Graeme Fernandez is a 78 y.o. male is here today for 1 year follow-up.  He follows in cardiology clinic secondary to coronary artery disease with a history of two-vessel CABG in 2017.    Previous Cardiac Testing and Procedures:  -Holter monitor (5/1/2016) rare atrial ventricular ectopic beats, no significant dysrhythmias, no symptoms reported  -Tilt table test (6/15/2016) positive for vasovagal syncope  -LHC (4/20/2017) severe single-vessel CAD involving proximal LAD and a large diagonal, normal LCx and RCA  -2 vessel CABG (5/4/2017 at [place]) LIMA to LAD, SVG to diagonal  -Chest x-ray (2/28/2022) mild left lower lobe opacities, evidence of CABG  -Holter monitor (2/28/2022) rare atrial and ventricular ectopic beats, no significant pauses, arrhythmias, or events, no symptoms reported  -Stress echo (5/10/2022) negative for ischemia     Lab data:  -Lipid panel (6/23/2020) total cholesterol 165, HDL 52, LDL 87, triglycerides 119  -Lipid panel (5/23/2021) total cholesterol 139, HDL 42, LDL 57, triglycerides 196  -BMP (3/28/2022) creatinine 1.03, potassium 3.9, sodium 139  -NT proBNP (2/28/2022) 44, normal 0-125  -Lipid panel (5/3/2022) total cholesterol 147, HDL 55, LDL 56, triglycerides 178    Patient Active Problem List   Diagnosis   • BPH (benign prostatic hyperplasia)   • Dyslipidemia   • Essential hypertension   • Coronary artery disease involving coronary bypass graft of native heart without angina pectoris       Social  "History     Tobacco Use   • Smoking status: Never   • Smokeless tobacco: Never   Vaping Use   • Vaping status: Never Used   Substance Use Topics   • Alcohol use: Never   • Drug use: Never       Family History   Problem Relation Age of Onset   • Heart disease Mother    • Alzheimer's disease Mother    • Emphysema Father        The following portions of the patient's history were reviewed and updated as appropriate: allergies, current medications, past family history, past medical history, past social history, past surgical history, and problem list.      Current Outpatient Medications:   •  aspirin 81 MG EC tablet, Take 1 tablet by mouth Daily., Disp: , Rfl:   •  atorvastatin (LIPITOR) 20 MG tablet, TAKE ONE TABLET BY MOUTH EVERY NIGHT, Disp: 90 tablet, Rfl: 3  •  finasteride (PROSCAR) 5 MG tablet, finasteride 5 mg tablet, Disp: , Rfl:   •  metoprolol tartrate (LOPRESSOR) 25 MG tablet, Take 1 tablet by mouth 2 (Two) Times a Day. TAKING 1/2 TABLET BID, Disp: , Rfl:   •  Multiple Vitamins-Minerals (MULTIVITAMIN ADULT PO), Take  by mouth daily., Disp: , Rfl:   •  Propylene Glycol (Systane Complete) 0.6 % solution, Systane Complete 0.6 % eye drops  Systane Complete 0.6 % eye drops, Disp: , Rfl:   •  tamsulosin (FLOMAX) 0.4 MG capsule 24 hr capsule, Take 1 capsule by mouth Daily., Disp: , Rfl:     ROS    Objective  /82 (BP Location: Left arm, Patient Position: Sitting, Cuff Size: Adult)   Pulse 52   Ht 175.3 cm (69.02\")   Wt 73.9 kg (163 lb)   SpO2 99%   BMI 24.06 kg/m²   Physical Exam  Procedures      ICD-10-CM ICD-9-CM   1. Coronary artery disease involving coronary bypass graft of native heart without angina pectoris  I25.810 414.05   2. Dyslipidemia  E78.5 272.4   3. Essential hypertension  I10 401.9         Assessment/Plan:  1.  Coronary artery disease: 2-vessel CABG in 2017 with LIMA to LAD and SVG to diagonal.  Low risk stress echo on 5/10/2022.  He remains chest pain-free.  Continue aspirin, metoprolol, " and atorvastatin.     2.  Dyslipidemia: Continue atorvastatin and obtain copy of most recent lipid panel from PCP.      3.  Essential hypertension: Well-controlled today.  Continue metoprolol.

## 2024-10-16 ENCOUNTER — TELEPHONE (OUTPATIENT)
Dept: CARDIOLOGY | Facility: CLINIC | Age: 79
End: 2024-10-16
Payer: MEDICARE

## 2024-10-16 NOTE — TELEPHONE ENCOUNTER
Pt stopped into the office this morning asking about vaccinations.  He is going to get his flu shot but was wondering if he needed to also get his RSV and Covid vaccines.  He wasn't sure on just how many vaccines he should receive and your take on getting them all.  Please advise.

## 2025-01-21 RX ORDER — METOPROLOL TARTRATE 25 MG/1
TABLET, FILM COATED ORAL
Qty: 90 TABLET | Refills: 3 | Status: SHIPPED | OUTPATIENT
Start: 2025-01-21

## 2025-07-10 RX ORDER — ATORVASTATIN CALCIUM 20 MG/1
20 TABLET, FILM COATED ORAL NIGHTLY
Qty: 90 TABLET | Refills: 3 | Status: SHIPPED | OUTPATIENT
Start: 2025-07-10